# Patient Record
Sex: FEMALE | Race: OTHER | NOT HISPANIC OR LATINO | URBAN - METROPOLITAN AREA
[De-identification: names, ages, dates, MRNs, and addresses within clinical notes are randomized per-mention and may not be internally consistent; named-entity substitution may affect disease eponyms.]

---

## 2024-04-19 ENCOUNTER — EMERGENCY (EMERGENCY)
Facility: HOSPITAL | Age: 69
LOS: 1 days | Discharge: HOME | End: 2024-04-19
Attending: EMERGENCY MEDICINE | Admitting: EMERGENCY MEDICINE
Payer: MEDICARE

## 2024-04-19 VITALS
RESPIRATION RATE: 18 BRPM | TEMPERATURE: 98 F | SYSTOLIC BLOOD PRESSURE: 168 MMHG | DIASTOLIC BLOOD PRESSURE: 91 MMHG | OXYGEN SATURATION: 100 % | HEART RATE: 54 BPM

## 2024-04-19 DIAGNOSIS — S01.511A LACERATION WITHOUT FOREIGN BODY OF LIP, INITIAL ENCOUNTER: ICD-10-CM

## 2024-04-19 DIAGNOSIS — R51.9 HEADACHE, UNSPECIFIED: ICD-10-CM
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
## 2024-04-19 DIAGNOSIS — Z23 ENCOUNTER FOR IMMUNIZATION: ICD-10-CM

## 2024-04-19 DIAGNOSIS — I10 ESSENTIAL (PRIMARY) HYPERTENSION: ICD-10-CM
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
## 2024-04-19 PROCEDURE — 70486 CT MAXILLOFACIAL W/O DYE: CPT | Mod: 26,MC

## 2024-04-19 PROCEDURE — 99284 EMERGENCY DEPT VISIT MOD MDM: CPT | Mod: 25

## 2024-04-19 PROCEDURE — 70450 CT HEAD/BRAIN W/O DYE: CPT | Mod: 26,MC

## 2024-04-19 PROCEDURE — 12013 RPR F/E/E/N/L/M 2.6-5.0 CM: CPT

## 2024-04-19 RX ORDER — IBUPROFEN 200 MG
1 TABLET ORAL
Qty: 20 | Refills: 0
Start: 2024-04-19 | End: 2024-04-23

## 2024-04-19 RX ORDER — ACETAMINOPHEN 500 MG
650 TABLET ORAL ONCE
Refills: 0 | Status: COMPLETED | OUTPATIENT
Start: 2024-04-19 | End: 2024-04-19

## 2024-04-19 RX ORDER — TETANUS TOXOID, REDUCED DIPHTHERIA TOXOID AND ACELLULAR PERTUSSIS VACCINE, ADSORBED 5; 2.5; 8; 8; 2.5 [IU]/.5ML; [IU]/.5ML; UG/.5ML; UG/.5ML; UG/.5ML
0.5 SUSPENSION INTRAMUSCULAR ONCE
Refills: 0 | Status: COMPLETED | OUTPATIENT
Start: 2024-04-19 | End: 2024-04-19

## 2024-04-19 RX ADMIN — Medication 1 TABLET(S): at 18:57

## 2024-04-19 RX ADMIN — Medication 650 MILLIGRAM(S): at 18:57

## 2024-04-19 RX ADMIN — TETANUS TOXOID, REDUCED DIPHTHERIA TOXOID AND ACELLULAR PERTUSSIS VACCINE, ADSORBED 0.5 MILLILITER(S): 5; 2.5; 8; 8; 2.5 SUSPENSION INTRAMUSCULAR at 18:58

## 2024-04-19 NOTE — ED PROVIDER NOTE - PATIENT PORTAL LINK FT
You can access the FollowMyHealth Patient Portal offered by [facility] by registering at the following website: [email or web address]. By joining [employer]’s FollowMyHealth portal, you will also be able to view your health information using other applications (apps) compatible with our system.

## 2024-04-19 NOTE — ED ADULT NURSE NOTE - NSFALLUNIVINTERV_ED_ALL_ED
Bed/Stretcher in lowest position, wheels locked, appropriate side rails in place/Call bell, personal items and telephone in reach/Instruct patient to call for assistance before getting out of bed/chair/stretcher/Non-slip footwear applied when patient is off stretcher/[place] to call system/Physically safe environment - no spills, clutter or unnecessary equipment/Purposeful proactive rounding/Room/bathroom lighting operational, light cord in reach

## 2024-04-19 NOTE — ED PROVIDER NOTE - CLINICAL SUMMARY MEDICAL DECISION MAKING FREE TEXT BOX
HPI this is a female patient with history of hypertension on amlodipine and losartan.  Patient today after a mechanical fall hitting face and head when she tripped and fell forward.  No loss of consciousness.  Slight headache.  Not on anticoagulation.  There is a laceration to the upper lip through and through approximately 2 cm length.  No loose teeth.  No blurred vision no chest pain or shortness of breath no extremity trauma.    On examination patient has a laceration to the middle of the upper lip that is through and through into mucosa.  Otherwise patient is well-appearing, no distress.  Normal neurological exam with no focal deficits.  Normal gait.    MDM - Will update tetanus, start p.o. Augmentin and provide analgesia.  Will do CT head and maxillofacial to rule out fractures or bleeding. HPI this is a female patient with history of hypertension on amlodipine and losartan.  Patient today after a mechanical fall hitting face and head when she tripped and fell forward.  No loss of consciousness.  Slight headache.  Not on anticoagulation.  There is a laceration to the upper lip through and through approximately 2 cm length.  No loose teeth.  No blurred vision no chest pain or shortness of breath no extremity trauma.    On examination patient has a laceration to the middle of the upper lip that is through and through into mucosa. outside 1.5 cm, inside 2.5 cm  Otherwise patient is well-appearing, no distress.  Normal neurological exam with no focal deficits.  Normal gait.    MDM - Will update tetanus, start p.o. Augmentin and provide analgesia.  Will do CT head and maxillofacial to rule out fractures or bleeding.

## 2024-04-19 NOTE — ED PROCEDURE NOTE - CPROC ED TIME OUT STATEMENT1
“Patient's name, , procedure and correct site were confirmed during the [place] Timeout.”
“Patient's name, , procedure and correct site were confirmed during the [place] Timeout.”

## 2024-04-19 NOTE — ED PROVIDER NOTE - NSFOLLOWUPINSTRUCTIONS_ED_ALL_ED_FT
Laceration    A laceration is a cut that goes through all of the layers of the skin and into the tissue that is right under the skin. Some lacerations heal on their own. Others need to be closed with skin adhesive strips, skin glue, stitches (sutures), or staples. Proper laceration care minimizes the risk of infection and helps the laceration to heal better.  If non-absorbable stitches or staples have been placed, they must be taken out within the time frame instructed by your healthcare provider.    SEEK IMMEDIATE MEDICAL CARE IF YOU HAVE ANY OF THE FOLLOWING SYMPTOMS: swelling around the wound, worsening pain, drainage from the wound, red streaking going away from your wound, inability to move finger or toe near the laceration, or discoloration of skin near the laceration. RETURN IN 5 DAYS FOR WOUND ASSESSMENT AND POSSIBLE SUTURE REMOVAL.     THE SUTURES INSIDE OF YOUR MOUTH WILL DISSOLVED ON THEIR OWN.     Laceration    A laceration is a cut that goes through all of the layers of the skin and into the tissue that is right under the skin. Some lacerations heal on their own. Others need to be closed with skin adhesive strips, skin glue, stitches (sutures), or staples. Proper laceration care minimizes the risk of infection and helps the laceration to heal better.  If non-absorbable stitches or staples have been placed, they must be taken out within the time frame instructed by your healthcare provider.    SEEK IMMEDIATE MEDICAL CARE IF YOU HAVE ANY OF THE FOLLOWING SYMPTOMS: swelling around the wound, worsening pain, drainage from the wound, red streaking going away from your wound, inability to move finger or toe near the laceration, or discoloration of skin near the laceration.

## 2024-04-19 NOTE — ED ADULT NURSE NOTE - OBJECTIVE STATEMENT
pt walked in complaining of laceration to the inside and outside of the upper lip. not utd with tetanus. denies loc or blood thinners. a+ox4, resp even and unlabored, steady gait.

## 2024-04-19 NOTE — ED ADULT TRIAGE NOTE - CHIEF COMPLAINT QUOTE
pt walked in complaining of laceration to the inside and outside of the upper lip. not utd with tetanus. denies loc or blood thinners.

## 2024-04-24 ENCOUNTER — EMERGENCY (EMERGENCY)
Facility: HOSPITAL | Age: 69
LOS: 1 days | Discharge: HOME | End: 2024-04-24
Admitting: EMERGENCY MEDICINE
Payer: MEDICARE

## 2024-04-24 VITALS
RESPIRATION RATE: 18 BRPM | HEART RATE: 70 BPM | SYSTOLIC BLOOD PRESSURE: 180 MMHG | DIASTOLIC BLOOD PRESSURE: 106 MMHG | OXYGEN SATURATION: 99 %

## 2024-04-24 VITALS
RESPIRATION RATE: 18 BRPM | SYSTOLIC BLOOD PRESSURE: 170 MMHG | TEMPERATURE: 98 F | HEART RATE: 81 BPM | OXYGEN SATURATION: 94 % | DIASTOLIC BLOOD PRESSURE: 108 MMHG

## 2024-04-24 PROCEDURE — L9995: CPT

## 2024-04-24 NOTE — ED ADULT TRIAGE NOTE - CHIEF COMPLAINT QUOTE
Pt here for suture removal to lip. Pt with history of htn states that she forgot to take her medication this morning.

## 2024-04-24 NOTE — ED PROVIDER NOTE - OBJECTIVE STATEMENT
68-year-old female presents today for suture removal to her upper lip.  Sutures were placed 5 days ago after mechanical trip and fall.  She had a through and through laceration, but sutures on the inner lip are Vicryl and will dissolve on their own.  Patient has no swelling or dehiscence or drainage from the wound.  She has no other symptoms today.

## 2024-04-24 NOTE — ED PROVIDER NOTE - ED STEMI HIDDEN
[facility] APPOINTMENT CONFIRMATION      Date: 11/9/2023    Time: 2:00PM    Provider name: Dr. Myriam Payne     Location: Neurology Locations: [address] [place] [address] [address] [address]    Zoom link sent (if applicable): N/A    Will patient be in [place] for the virtual visit? N/A    Is patient currently in a facility? No    Alternative contact information: N/A    Appointment confirmed: No and left phone message    \"Please ensure you bring a medication list, including the dates and times you take each medication.\"    \"We do have free parking available in the main hospital parking lot. However, parking can be difficult to find so we ask that you arrive 40 minutes prior to your appointment.\"      
hide

## 2024-04-24 NOTE — ED PROVIDER NOTE - CLINICAL SUMMARY MEDICAL DECISION MAKING FREE TEXT BOX
Patient presents today for suture removal.  Sutures were placed 5 days ago.  She is feeling well and has no other complaints.  Sutures removed.

## 2024-04-24 NOTE — ED PROVIDER NOTE - PATIENT PORTAL LINK FT
You can access the FollowMyHealth Patient Portal offered by [facility] by registering at the following website: [email or web address]. By joining [employer]’s FollowMyHealth portal, you will also be able to view your health information using other applications (apps) compatible with our system.

## 2024-04-24 NOTE — ED ADULT NURSE NOTE - ED STAT RN HANDOFF DETAILS
BP elevated upon discharge. pt states she understands her BP is elevated as she forgot her medication at home. last took last night while working. reports that she will take her BP medication when she gets home.

## 2024-04-24 NOTE — ED ADULT NURSE NOTE - NSFALLUNIVINTERV_ED_ALL_ED
Bed/Stretcher in lowest position, wheels locked, appropriate side rails in place/Call bell, personal items and telephone in reach/Instruct patient to call for assistance before getting out of bed/chair/stretcher/Non-slip footwear applied when patient is off stretcher/[place] to call system/Physically safe environment - no spills, clutter or unnecessary equipment/Purposeful proactive rounding/Room/bathroom lighting operational, light cord in reach

## 2024-04-24 NOTE — ED PROVIDER NOTE - PHYSICAL EXAMINATION
Constitutional: Well appearing, awake, alert, oriented to person, place, time/situation and in no apparent distress.  HEENT: Airway patent, NCAT  Resp: no conversational dyspnea, tachypnea, or signs of respiratory distress  Msk: ambulating with normal steady gait  Neuro: A&Ox3  Skin: 3 sutures in place to upper lip without drainage or dehiscence, sutures in place to inner lip. no swelling or redness or warmth or drainage.

## 2024-04-26 DIAGNOSIS — S01.511D LACERATION WITHOUT FOREIGN BODY OF LIP, SUBSEQUENT ENCOUNTER: ICD-10-CM
